# Patient Record
Sex: FEMALE | Race: WHITE | Employment: FULL TIME | URBAN - METROPOLITAN AREA
[De-identification: names, ages, dates, MRNs, and addresses within clinical notes are randomized per-mention and may not be internally consistent; named-entity substitution may affect disease eponyms.]

---

## 2018-10-31 ENCOUNTER — HOSPITAL ENCOUNTER (EMERGENCY)
Age: 63
Discharge: HOME | End: 2018-10-31
Attending: EMERGENCY MEDICINE
Payer: COMMERCIAL

## 2018-10-31 ENCOUNTER — APPOINTMENT (OUTPATIENT)
Dept: CT IMAGING | Age: 63
End: 2018-10-31
Attending: EMERGENCY MEDICINE
Payer: COMMERCIAL

## 2018-10-31 VITALS
DIASTOLIC BLOOD PRESSURE: 65 MMHG | WEIGHT: 203 LBS | TEMPERATURE: 99 F | HEIGHT: 64 IN | RESPIRATION RATE: 18 BRPM | BODY MASS INDEX: 34.66 KG/M2 | SYSTOLIC BLOOD PRESSURE: 124 MMHG | OXYGEN SATURATION: 96 % | HEART RATE: 78 BPM

## 2018-10-31 DIAGNOSIS — K57.92 DIVERTICULITIS: Primary | ICD-10-CM

## 2018-10-31 LAB
ANION GAP SERPL CALC-SCNC: 10 MMOL/L (ref 3–18)
APPEARANCE UR: CLEAR
BACTERIA URNS QL MICRO: ABNORMAL /HPF
BILIRUB UR QL: NEGATIVE
BUN SERPL-MCNC: 21 MG/DL (ref 7–18)
BUN/CREAT SERPL: 22
CALCIUM SERPL-MCNC: 9.2 MG/DL (ref 8.5–10.1)
CHLORIDE SERPL-SCNC: 105 MMOL/L (ref 100–108)
CO2 SERPL-SCNC: 23 MMOL/L (ref 21–32)
COLOR UR: YELLOW
CREAT SERPL-MCNC: 0.94 MG/DL (ref 0.6–1.3)
EPITH CASTS URNS QL MICRO: NEGATIVE /LPF (ref 0–5)
ERYTHROCYTE [DISTWIDTH] IN BLOOD BY AUTOMATED COUNT: 13.4 % (ref 11.6–14.5)
GLUCOSE SERPL-MCNC: 99 MG/DL (ref 74–99)
GLUCOSE UR STRIP.AUTO-MCNC: NEGATIVE MG/DL
HCT VFR BLD AUTO: 41.1 % (ref 35–45)
HGB BLD-MCNC: 14 G/DL (ref 12–16)
HGB UR QL STRIP: ABNORMAL
KETONES UR QL STRIP.AUTO: NEGATIVE MG/DL
LEUKOCYTE ESTERASE UR QL STRIP.AUTO: NEGATIVE
LIPASE SERPL-CCNC: 181 U/L (ref 73–393)
MCH RBC QN AUTO: 29.2 PG (ref 24–34)
MCHC RBC AUTO-ENTMCNC: 34.1 G/DL (ref 31–37)
MCV RBC AUTO: 85.6 FL (ref 74–97)
MUCOUS THREADS URNS QL MICRO: NEGATIVE /LPF
NITRITE UR QL STRIP.AUTO: NEGATIVE
PH UR STRIP: 5.5 [PH] (ref 5–8)
PLATELET # BLD AUTO: 220 K/UL (ref 135–420)
PMV BLD AUTO: 10.7 FL (ref 9.2–11.8)
POTASSIUM SERPL-SCNC: 4.2 MMOL/L (ref 3.5–5.5)
PROT UR STRIP-MCNC: NEGATIVE MG/DL
RBC # BLD AUTO: 4.8 M/UL (ref 4.2–5.3)
RBC #/AREA URNS HPF: ABNORMAL /HPF (ref 0–5)
SODIUM SERPL-SCNC: 138 MMOL/L (ref 136–145)
SP GR UR REFRACTOMETRY: 1.02 (ref 1–1.03)
UROBILINOGEN UR QL STRIP.AUTO: 0.2 EU/DL (ref 0.2–1)
WBC # BLD AUTO: 12.6 K/UL (ref 4.6–13.2)
WBC URNS QL MICRO: NEGATIVE /HPF (ref 0–5)

## 2018-10-31 PROCEDURE — 74011250636 HC RX REV CODE- 250/636: Performed by: EMERGENCY MEDICINE

## 2018-10-31 PROCEDURE — 81001 URINALYSIS AUTO W/SCOPE: CPT | Performed by: EMERGENCY MEDICINE

## 2018-10-31 PROCEDURE — 83690 ASSAY OF LIPASE: CPT | Performed by: EMERGENCY MEDICINE

## 2018-10-31 PROCEDURE — 85027 COMPLETE CBC AUTOMATED: CPT | Performed by: EMERGENCY MEDICINE

## 2018-10-31 PROCEDURE — 99284 EMERGENCY DEPT VISIT MOD MDM: CPT

## 2018-10-31 PROCEDURE — 96374 THER/PROPH/DIAG INJ IV PUSH: CPT

## 2018-10-31 PROCEDURE — 96375 TX/PRO/DX INJ NEW DRUG ADDON: CPT

## 2018-10-31 PROCEDURE — 80048 BASIC METABOLIC PNL TOTAL CA: CPT | Performed by: EMERGENCY MEDICINE

## 2018-10-31 PROCEDURE — 74176 CT ABD & PELVIS W/O CONTRAST: CPT

## 2018-10-31 PROCEDURE — 96361 HYDRATE IV INFUSION ADD-ON: CPT

## 2018-10-31 RX ORDER — METRONIDAZOLE 500 MG/1
500 TABLET ORAL 3 TIMES DAILY
Qty: 30 TAB | Refills: 0 | Status: SHIPPED | OUTPATIENT
Start: 2018-10-31 | End: 2018-11-10

## 2018-10-31 RX ORDER — SODIUM CHLORIDE 9 MG/ML
500 INJECTION, SOLUTION INTRAVENOUS ONCE
Status: COMPLETED | OUTPATIENT
Start: 2018-10-31 | End: 2018-10-31

## 2018-10-31 RX ORDER — MORPHINE SULFATE 2 MG/ML
2 INJECTION, SOLUTION INTRAMUSCULAR; INTRAVENOUS
Status: COMPLETED | OUTPATIENT
Start: 2018-10-31 | End: 2018-10-31

## 2018-10-31 RX ORDER — CIPROFLOXACIN 500 MG/1
500 TABLET ORAL 2 TIMES DAILY
Qty: 20 TAB | Refills: 0 | Status: SHIPPED | OUTPATIENT
Start: 2018-10-31 | End: 2018-11-10

## 2018-10-31 RX ORDER — ONDANSETRON 2 MG/ML
4 INJECTION INTRAMUSCULAR; INTRAVENOUS
Status: COMPLETED | OUTPATIENT
Start: 2018-10-31 | End: 2018-10-31

## 2018-10-31 RX ADMIN — ONDANSETRON 4 MG: 2 INJECTION INTRAMUSCULAR; INTRAVENOUS at 06:47

## 2018-10-31 RX ADMIN — SODIUM CHLORIDE 500 ML: 900 INJECTION, SOLUTION INTRAVENOUS at 06:47

## 2018-10-31 RX ADMIN — MORPHINE SULFATE 2 MG: 2 INJECTION, SOLUTION INTRAMUSCULAR; INTRAVENOUS at 06:47

## 2018-10-31 NOTE — DISCHARGE INSTRUCTIONS

## 2018-10-31 NOTE — ED PROVIDER NOTES
EMERGENCY DEPARTMENT HISTORY AND PHYSICAL EXAM 
 
Date: 10/31/2018 Patient Name: Choco Dela Cruz History of Presenting Illness Chief Complaint Patient presents with  Abdominal Pain History Provided By: Patient Chief Complaint: Abdominal pain Duration: 2 days Timing:  Intermittent Location: Abdomen Severity: 3 out of 10 Associated Symptoms: nausea and subjective fever Additional History (Context):  
6:18 AM 
Choco Dela Cruz is a 63 y.o. female with PSHx of colectomy, tubal ligation, and appendectomy who presents to the emergency department C/O intermittent abdominal pain (rated 3/10), onset 2 days ago. Associated sxs include nausea and subjective fever. States her BM's have been normal. Has no H/O DM or heart issues. Pt denies vomiting, blood in stool, diarrhea, constipation, chills, or any other sxs or complaints. PCP: Tess Mann MD 
 
 
 
Past History Past Medical History: 
Past Medical History:  
Diagnosis Date  Thyroid disease Past Surgical History: 
Past Surgical History:  
Procedure Laterality Date  ABDOMEN SURGERY PROC UNLISTED    
 bowel surgery  HX APPENDECTOMY  HX GYN    
 tubal ligation Family History: 
History reviewed. No pertinent family history. Social History: 
Social History Tobacco Use  Smoking status: Never Smoker  Smokeless tobacco: Never Used Substance Use Topics  Alcohol use: No  
  Frequency: Never  Drug use: No  
 
 
Allergies: Allergies Allergen Reactions  Codeine Nausea and Vomiting  Lipitor [Atorvastatin] Other (comments)  
  cramping Review of Systems Review of Systems Constitutional: Positive for fever (subjective). Negative for chills. Gastrointestinal: Positive for abdominal pain and nausea. Negative for blood in stool, constipation, diarrhea and vomiting. All other systems reviewed and are negative. Physical Exam  
 
Vitals: 10/31/18 0535 10/31/18 0600 10/31/18 6019 BP: 169/70  135/68 Pulse: 93  80 Resp: 18  16 Temp: 98.5 °F (36.9 °C) SpO2: 97% 97% 99% Weight: 92.1 kg (203 lb) Height: 5' 4\" (1.626 m) Physical Exam  
Constitutional: She is oriented to person, place, and time. She appears well-developed and well-nourished. No distress. Pleasant elderly female in no distress HENT:  
Head: Normocephalic and atraumatic. Eyes: Pupils are equal, round, and reactive to light. Neck: Neck supple. Cardiovascular: Normal rate, regular rhythm, S1 normal, S2 normal and normal heart sounds. Pulmonary/Chest: Breath sounds normal. No respiratory distress. She has no wheezes. She has no rales. She exhibits no tenderness. Abdominal: Soft. She exhibits no distension and no mass. There is tenderness in the suprapubic area. There is no guarding. Tender in the suprapubic area with no guarding Musculoskeletal: Normal range of motion. She exhibits no edema or tenderness. Neurological: She is alert and oriented to person, place, and time. No cranial nerve deficit. Skin: No rash noted. Psychiatric: She has a normal mood and affect. Her behavior is normal. Thought content normal.  
Nursing note and vitals reviewed. Diagnostic Study Results Labs - Recent Results (from the past 12 hour(s)) CBC W/O DIFF Collection Time: 10/31/18  6:00 AM  
Result Value Ref Range WBC 12.6 4.6 - 13.2 K/uL  
 RBC 4.80 4.20 - 5.30 M/uL  
 HGB 14.0 12.0 - 16.0 g/dL HCT 41.1 35.0 - 45.0 % MCV 85.6 74.0 - 97.0 FL  
 MCH 29.2 24.0 - 34.0 PG  
 MCHC 34.1 31.0 - 37.0 g/dL  
 RDW 13.4 11.6 - 14.5 % PLATELET 976 659 - 951 K/uL MPV 10.7 9.2 - 41.6 FL  
METABOLIC PANEL, BASIC Collection Time: 10/31/18  6:00 AM  
Result Value Ref Range Sodium 138 136 - 145 mmol/L Potassium 4.2 3.5 - 5.5 mmol/L Chloride 105 100 - 108 mmol/L  
 CO2 23 21 - 32 mmol/L Anion gap 10 3.0 - 18 mmol/L Glucose 99 74 - 99 mg/dL BUN 21 (H) 7.0 - 18 MG/DL Creatinine 0.94 0.6 - 1.3 MG/DL  
 BUN/Creatinine ratio 22 GFR est AA >60 >60 ml/min/1.73m2 GFR est non-AA >60 >60 ml/min/1.73m2 Calcium 9.2 8.5 - 10.1 MG/DL  
LIPASE Collection Time: 10/31/18  6:00 AM  
Result Value Ref Range Lipase 181 73 - 393 U/L  
URINALYSIS W/ RFLX MICROSCOPIC Collection Time: 10/31/18  6:30 AM  
Result Value Ref Range Color YELLOW Appearance CLEAR Specific gravity 1.020 1.005 - 1.030    
 pH (UA) 5.5 5.0 - 8.0 Protein NEGATIVE  NEG mg/dL Glucose NEGATIVE  NEG mg/dL Ketone NEGATIVE  NEG mg/dL Bilirubin NEGATIVE  NEG Blood MODERATE (A) NEG Urobilinogen 0.2 0.2 - 1.0 EU/dL Nitrites NEGATIVE  NEG Leukocyte Esterase NEGATIVE  NEG    
URINE MICROSCOPIC ONLY Collection Time: 10/31/18  6:30 AM  
Result Value Ref Range WBC NEGATIVE  0 - 5 /hpf  
 RBC 3 to 6 0 - 5 /hpf Epithelial cells NEGATIVE  0 - 5 /lpf Bacteria FEW (A) NEG /hpf Mucus NEGATIVE  NEG /lpf Radiologic Studies -   
CT ABD PELV WO CONT Final Result CT Results  (Last 48 hours) 10/31/18 0710  CT ABD PELV WO CONT Final result Impression:  IMPRESSION:  
   
Findings of acute diverticulitis involving the sigmoid colon. No evidence of  
diverticular abscess. Narrative:  EXAM: CT ABDOMEN AND PELVIS (NON-CONTRAST) INDICATION: Abdominal pain COMPARISON: No prior comparison study TECHNIQUE: Axial CT imaging of the abdomen and pelvis was performed without IV  
or oral contrast as per specific clinician request.  Multiplanar reformats were  
generated. One or more dose reduction techniques were used on this CT:  
automated exposure control, adjustment of the mAs and/or kVp according to  
patient's size, and iterative reconstruction techniques. The specific techniques  
utilized on this CT exam have been documented in the patient's electronic  
medical record.  
   
_______________ FINDINGS:  
   
The lack of oral and IV contrast limits evaluation of the solid organs and  
bowel. LOWER CHEST: Minimal subpleural reticular densities are present both lung bases  
representing atelectasis or scarring. No consolidation is seen. There is no  
pericardial or pleural effusion. LIVER, BILIARY: Liver is unremarkable. No abnormal biliary dilation. Cholelithiasis is present without CT evidence of acute cholecystitis. PANCREAS: Unremarkable. SPLEEN: Unremarkable. ADRENALS: Unremarkable. KIDNEYS: Unremarkable. LYMPH NODES: No enlarged lymph nodes by size criteria. GASTROINTESTINAL TRACT: The lack of oral contrast limits bowel evaluation. Colonic wall thickening is present involving the sigmoid colon in the  
central/left central pelvis. Moderate amount of surrounding inflammatory fat  
stranding is present. There is a hyperdense diverticulum centered within the  
inflammatory changes suggesting acute diverticulitis. There is no evidence of  
diverticular abscess. A few other scattered colonic diverticula are present. Appendix is within normal limits. Small bowel is within normal limits without  
evidence of obstruction. PELVIC ORGANS: Unremarkable. VASCULATURE: Atherosclerotic calcifications are present. OSSEOUS: Degenerative changes are seen in the spine. OTHER: None.  
   
_______________ CXR Results  (Last 48 hours) None Medical Decision Making I am the first provider for this patient. I reviewed the vital signs, available nursing notes, past medical history, past surgical history, family history and social history. Vital Signs-Reviewed the patient's vital signs. Pulse Oximetry Analysis - 97% on RA Records Reviewed: Nursing Notes and Old Medical Records Provider Notes (Medical Decision Making):  
INITIAL CLINICAL IMPRESSION and PLANS: 
 The patient presents with the primary complaint(s) of: abdominal pain. The presentation, to include historical aspects and clinical findings are consistent with the DX of lower abdomen pain. However, other possible DX's to consider as primary, associated with, or exacerbated by include: 1. Diverticulitis 2. UTI 3. Colitis 4. Pancreatitis 5. Pyelonephritis 6. Partial bowel obstruction Recorded by Mechelle Murry ED Scribe, as dictated by Bibiana Aguiar MD. 
 
Procedures: 
Procedures MEDICATIONS GIVEN: 
Medications  
0.9% sodium chloride infusion 500 mL (0 mL IntraVENous IV Completed 10/31/18 0735) ondansetron [facility] injection 4 mg (4 mg IntraVENous Given 10/31/18 0647)  
morphine injection 2 mg (2 mg IntraVENous Given 10/31/18 0647) ED Course:  
6:18 AM  
Initial assessment performed. The patients presenting problems have been discussed, and they are in agreement with the care plan formulated and outlined with them. I have encouraged them to ask questions as they arise throughout their visit. SIGN OUT: 
7:05 AM 
Patient's presentation, labs/imaging and plan of care was reviewed with Fernando Barth MD as part of sign out. They will await CT abd/pelv and dispo as part of the plan discussed with the patient. Fernando Barth MD's assistance in completion of this plan is greatly appreciated but it should be noted that I will be the provider of record for this patient. Written by Mechelle Murry ED Scribe, as dictated by Bibiana Aguiar MD. 
 
7:39 AM Acute diverticulitis found on CT. D/c with PCP f/u. Will start on Cipro/Flagyl. Discussed with pt on d/c. Diagnosis and Disposition DISCHARGE NOTE: 
7:39 AM 
Carlos Eduardo Poag [id number]  results have been reviewed with her. She has been counseled regarding her diagnosis, treatment, and plan.   She verbally conveys understanding and agreement of the signs, symptoms, diagnosis, treatment and prognosis and additionally agrees to follow up as discussed. She also agrees with the care-plan and conveys that all of her questions have been answered. I have also provided discharge instructions for her that include: educational information regarding their diagnosis and treatment, and list of reasons why they would want to return to the ED prior to their follow-up appointment, should her condition change. She has been provided with education for proper emergency department utilization. CLINICAL IMPRESSION: 
 
1. Diverticulitis PLAN: 
1. D/C Home 2. Current Discharge Medication List  
  
START taking these medications Details  
ciprofloxacin HCl (CIPRO) 500 mg tablet Take 1 Tab by mouth two (2) times a day for 10 days. Qty: 20 Tab, Refills: 0  
  
metroNIDAZOLE (FLAGYL) 500 mg tablet Take 1 Tab by mouth three (3) times daily for 10 days. Qty: 30 Tab, Refills: 0  
  
  
 
3. Follow-up Information Follow up With Specialties Details Why Contact Info Fortunato Vences MD Internal Medicine Schedule an appointment as soon as possible for a visit For Primary Care Follow Up [address] [address] [address] [facility] [address] [phone] [facility] EMERGENCY DEPT Emergency Medicine Go to If symptoms worsen, As needed 2 Kyleigh Salcido 
[address] [address] 
[phone]  
  
 
_______________________________ Attestations: This note is prepared by Cece Noble, acting as Scribe for [employer], MD. [employer], MD:  The scribe's documentation has been prepared under my direction and personally reviewed by me in its entirety. I confirm that the note above accurately reflects all work, treatment, procedures, and medical decision making performed by me. This note is prepared by Leonard Elise, acting as Scribe for Sanjana Babin MD. Sanjana Babin MD:  The scribe's documentation has been prepared under my direction and personally reviewed by me in its entirety. I confirm that the note above accurately reflects all work, treatment, procedures, and medical decision making performed by me. 
_______________________________

## 2020-11-14 NOTE — ED NOTES
Pt alert and resting on stretcher, siderails up, call bell in reach and bed in low position. Pt has no expressed needs at this time, will continue to monitor. 59